# Patient Record
Sex: MALE | Race: BLACK OR AFRICAN AMERICAN | NOT HISPANIC OR LATINO | URBAN - METROPOLITAN AREA
[De-identification: names, ages, dates, MRNs, and addresses within clinical notes are randomized per-mention and may not be internally consistent; named-entity substitution may affect disease eponyms.]

---

## 2024-02-15 ENCOUNTER — HOSPITAL ENCOUNTER (EMERGENCY)
Facility: HOSPITAL | Age: 59
Discharge: HOME | End: 2024-02-15
Attending: EMERGENCY MEDICINE
Payer: COMMERCIAL

## 2024-02-15 VITALS
BODY MASS INDEX: 35.62 KG/M2 | DIASTOLIC BLOOD PRESSURE: 97 MMHG | OXYGEN SATURATION: 98 % | TEMPERATURE: 96.8 F | WEIGHT: 263.01 LBS | SYSTOLIC BLOOD PRESSURE: 145 MMHG | HEART RATE: 86 BPM | RESPIRATION RATE: 14 BRPM | HEIGHT: 72 IN

## 2024-02-15 DIAGNOSIS — F19.920 DRUG INTOXICATION WITHOUT COMPLICATION (MULTI): Primary | ICD-10-CM

## 2024-02-15 LAB — GLUCOSE BLD MANUAL STRIP-MCNC: 125 MG/DL (ref 74–99)

## 2024-02-15 PROCEDURE — 2500000004 HC RX 250 GENERAL PHARMACY W/ HCPCS (ALT 636 FOR OP/ED): Performed by: NURSE PRACTITIONER

## 2024-02-15 PROCEDURE — 82947 ASSAY GLUCOSE BLOOD QUANT: CPT

## 2024-02-15 PROCEDURE — 99284 EMERGENCY DEPT VISIT MOD MDM: CPT | Mod: 25 | Performed by: EMERGENCY MEDICINE

## 2024-02-15 PROCEDURE — 96374 THER/PROPH/DIAG INJ IV PUSH: CPT

## 2024-02-15 RX ORDER — NALOXONE HYDROCHLORIDE 1 MG/ML
2 INJECTION INTRAMUSCULAR; INTRAVENOUS; SUBCUTANEOUS ONCE
Status: COMPLETED | OUTPATIENT
Start: 2024-02-15 | End: 2024-02-15

## 2024-02-15 RX ADMIN — NALOXONE HYDROCHLORIDE 2 MG: 1 INJECTION INTRAMUSCULAR; INTRAVENOUS; SUBCUTANEOUS at 17:49

## 2024-02-15 ASSESSMENT — PAIN SCALES - GENERAL
PAINLEVEL_OUTOF10: 0 - NO PAIN

## 2024-02-15 ASSESSMENT — PAIN - FUNCTIONAL ASSESSMENT: PAIN_FUNCTIONAL_ASSESSMENT: 0-10

## 2024-02-16 NOTE — ED PROVIDER NOTES
HPI   Chief Complaint   Patient presents with    Weakness, Gen     Pt used cocaine, and became fearfull       This is a 58-year-old -American male presenting to the emergency room with complaints of general weakness.  The patient was arrested after stealing from an area store.  The patient was pulled over and arrested.  The patient was in the middle of being processed when the patient became to very sleepy.  He was very difficult to arouse.  The patient states that he is not having any pain.  He denies any fevers or cold-like symptoms.  He states he has been eating and drinking normally.  The patient endorsed using cocaine.  He is not having any abdominal pain alteration in his urine or bowel function.  He denies any headache.  He states that he is not on any routine medications.                          David Coma Scale Score: 15                     Patient History   Past Medical History:   Diagnosis Date    Personal history of other (healed) physical injury and trauma     History of back injury     Past Surgical History:   Procedure Laterality Date    OTHER SURGICAL HISTORY  02/21/2020    Toe amputation     No family history on file.  Social History     Tobacco Use    Smoking status: Not on file    Smokeless tobacco: Not on file   Substance Use Topics    Alcohol use: Not on file    Drug use: Not on file       Physical Exam   ED Triage Vitals [02/15/24 1654]   Temperature Heart Rate Respirations BP   36.3 °C (97.3 °F) 72 16 142/88      Pulse Ox Temp Source Heart Rate Source Patient Position   100 % Temporal Monitor --      BP Location FiO2 (%)     Left arm --       Physical Exam  Vitals and nursing note reviewed.   Constitutional:       Appearance: Normal appearance. He is normal weight.   HENT:      Head: Normocephalic and atraumatic.      Right Ear: Tympanic membrane normal.      Left Ear: Tympanic membrane normal.      Nose: Nose normal.      Mouth/Throat:      Mouth: Mucous membranes are moist.       "Pharynx: Oropharynx is clear.   Eyes:      Extraocular Movements: Extraocular movements intact.      Conjunctiva/sclera: Conjunctivae normal.      Pupils: Pupils are equal, round, and reactive to light.   Cardiovascular:      Rate and Rhythm: Normal rate and regular rhythm.      Pulses: Normal pulses.   Pulmonary:      Effort: Pulmonary effort is normal.      Breath sounds: Normal breath sounds.   Abdominal:      General: Abdomen is flat. Bowel sounds are normal.      Palpations: Abdomen is soft.   Genitourinary:     Comments: No CVA tenderness or pubic pain.  Musculoskeletal:         General: Normal range of motion.   Skin:     General: Skin is warm and dry.      Capillary Refill: Capillary refill takes less than 2 seconds.   Neurological:      General: No focal deficit present.      Cranial Nerves: Cranial nerves 2-12 are intact.      Sensory: Sensation is intact.      Motor: Motor function is intact.      Coordination: Coordination is intact.      Gait: Gait is intact.      Comments: Patient is sleeping in bed.  Arouses to tactile and verbal stimuli.  Patient is able to answer questions while awake.  He quickly falls back asleep.   Psychiatric:         Mood and Affect: Mood normal.         Judgment: Judgment normal.         ED Course & MDM   Diagnoses as of 02/15/24 1930   Drug intoxication without complication (CMS/[facility]ly [facility])       Medical Decision Making  Patient was seen and evaluated with the attending physician, Dr. Chávez.  The patient is sleeping on arrival to the emergency room.  The patient arouses with tactile and verbal stimuli.  The patient is able to protect his airway.  Patient was placed on cardiac monitor and continuous pulse oximetry.  His vital signs are stable.  The patient was administered 2 mg of Narcan.  Upon 20 minutes later the patient was up using the bathroom without difficulties.  The patient was able to ambulate.  He was answering questions appropriately.  He requested that \"Paul \"be called so " he can pick him up.  The patient is from [place] and does not have a ride back home.  We suspect that the patient is suffering from drug intoxication.  Patient readily admitted that he had used cocaine.  It may have been laced with something causing his sleepiness.  The patient is to be discharged in the care of his friend once he arrives.        Procedure  Procedures     Adriana Hamilton, MIGUEL-BELKYS  02/15/24 1936

## 2025-08-29 ENCOUNTER — HOSPITAL ENCOUNTER (EMERGENCY)
Facility: HOSPITAL | Age: 60
Discharge: HOME | End: 2025-08-29
Attending: EMERGENCY MEDICINE
Payer: COMMERCIAL

## 2025-08-29 ENCOUNTER — APPOINTMENT (OUTPATIENT)
Dept: CARDIOLOGY | Facility: HOSPITAL | Age: 60
End: 2025-08-29
Payer: COMMERCIAL

## 2025-08-29 ENCOUNTER — APPOINTMENT (OUTPATIENT)
Dept: RADIOLOGY | Facility: HOSPITAL | Age: 60
End: 2025-08-29
Payer: COMMERCIAL

## 2025-08-29 ASSESSMENT — PAIN - FUNCTIONAL ASSESSMENT: PAIN_FUNCTIONAL_ASSESSMENT: 0-10

## 2025-09-03 ENCOUNTER — HOSPITAL ENCOUNTER (EMERGENCY)
Facility: HOSPITAL | Age: 60
Discharge: HOME | End: 2025-09-03
Payer: COMMERCIAL

## 2025-09-03 VITALS
TEMPERATURE: 98.1 F | HEART RATE: 80 BPM | RESPIRATION RATE: 16 BRPM | SYSTOLIC BLOOD PRESSURE: 168 MMHG | BODY MASS INDEX: 37.89 KG/M2 | OXYGEN SATURATION: 97 % | WEIGHT: 250 LBS | HEIGHT: 68 IN | DIASTOLIC BLOOD PRESSURE: 82 MMHG

## 2025-09-03 DIAGNOSIS — Z86.39 HISTORY OF DIABETES MELLITUS: ICD-10-CM

## 2025-09-03 DIAGNOSIS — R60.0 PERIPHERAL EDEMA: ICD-10-CM

## 2025-09-03 DIAGNOSIS — R14.0 BLOATING SYMPTOM: Primary | ICD-10-CM

## 2025-09-03 LAB — GLUCOSE BLD MANUAL STRIP-MCNC: 143 MG/DL (ref 74–99)

## 2025-09-03 PROCEDURE — 99282 EMERGENCY DEPT VISIT SF MDM: CPT

## 2025-09-03 PROCEDURE — 82947 ASSAY GLUCOSE BLOOD QUANT: CPT

## 2025-09-03 RX ORDER — DICYCLOMINE HYDROCHLORIDE 10 MG/1
10 CAPSULE ORAL 4 TIMES DAILY PRN
Qty: 15 TABLET | Refills: 0 | Status: SHIPPED | OUTPATIENT
Start: 2025-09-03 | End: 2025-09-11

## 2025-09-03 ASSESSMENT — PAIN - FUNCTIONAL ASSESSMENT: PAIN_FUNCTIONAL_ASSESSMENT: 0-10

## 2025-09-03 ASSESSMENT — PAIN SCALES - GENERAL: PAINLEVEL_OUTOF10: 0 - NO PAIN

## 2025-09-08 ENCOUNTER — APPOINTMENT (OUTPATIENT)
Dept: GASTROENTEROLOGY | Facility: CLINIC | Age: 60
End: 2025-09-08
Payer: COMMERCIAL

## 2025-09-10 ENCOUNTER — APPOINTMENT (OUTPATIENT)
Dept: UROLOGY | Facility: CLINIC | Age: 60
End: 2025-09-10
Payer: COMMERCIAL

## 2025-09-29 ENCOUNTER — APPOINTMENT (OUTPATIENT)
Dept: PRIMARY CARE | Facility: CLINIC | Age: 60
End: 2025-09-29
Payer: COMMERCIAL